# Patient Record
Sex: FEMALE | Race: WHITE
[De-identification: names, ages, dates, MRNs, and addresses within clinical notes are randomized per-mention and may not be internally consistent; named-entity substitution may affect disease eponyms.]

---

## 2021-05-10 ENCOUNTER — HOSPITAL ENCOUNTER (OUTPATIENT)
Dept: HOSPITAL 63 - US | Age: 36
End: 2021-05-10
Payer: OTHER GOVERNMENT

## 2021-05-10 DIAGNOSIS — M79.661: Primary | ICD-10-CM

## 2021-05-10 PROCEDURE — 93971 EXTREMITY STUDY: CPT

## 2021-05-10 NOTE — RAD
INDICATION: Reason: RT POSTERIOR CALF PAIN / Spl. Instructions:  / History: 



COMPARISON: None.



TECHNIQUE: Grayscale, color and doppler ultrasound images were obtained of the right lower extremity 
venous vasculature.



RIGHT:



No thrombus identified in the common femoral vein, femoral vein, popliteal vein or visualized calf ve
ins.





IMPRESSION:



*  No thrombus identified in deep venous system of right lower extremity.



Electronically signed by: Diallo Moura MD (5/10/2021 12:40 PM) [id number]